# Patient Record
Sex: FEMALE | Race: WHITE | NOT HISPANIC OR LATINO | Employment: OTHER | URBAN - METROPOLITAN AREA
[De-identification: names, ages, dates, MRNs, and addresses within clinical notes are randomized per-mention and may not be internally consistent; named-entity substitution may affect disease eponyms.]

---

## 2017-08-29 ENCOUNTER — HOSPITAL ENCOUNTER (OUTPATIENT)
Dept: ORTHOPEDIC SURGERY | Facility: CLINIC | Age: 82
Discharge: HOME | End: 2017-08-29
Attending: ORTHOPAEDIC SURGERY | Admitting: ORTHOPAEDIC SURGERY

## 2018-02-27 ENCOUNTER — HOSPITAL ENCOUNTER (OUTPATIENT)
Dept: ORTHOPEDIC SURGERY | Facility: CLINIC | Age: 83
Discharge: HOME | End: 2018-02-27
Attending: ORTHOPAEDIC SURGERY | Admitting: ORTHOPAEDIC SURGERY

## 2019-09-06 ENCOUNTER — OFFICE VISIT (OUTPATIENT)
Dept: ORTHOPEDIC SURGERY | Facility: CLINIC | Age: 84
End: 2019-09-06

## 2019-09-06 VITALS
HEIGHT: 63 IN | SYSTOLIC BLOOD PRESSURE: 150 MMHG | DIASTOLIC BLOOD PRESSURE: 72 MMHG | WEIGHT: 144 LBS | HEART RATE: 81 BPM | BODY MASS INDEX: 25.52 KG/M2

## 2019-09-06 DIAGNOSIS — M17.12 PRIMARY LOCALIZED OSTEOARTHROSIS OF LEFT LOWER LEG: ICD-10-CM

## 2019-09-06 DIAGNOSIS — M25.562 ACUTE PAIN OF LEFT KNEE: Primary | ICD-10-CM

## 2019-09-06 PROCEDURE — 20610 DRAIN/INJ JOINT/BURSA W/O US: CPT | Performed by: FAMILY MEDICINE

## 2019-09-06 PROCEDURE — 99213 OFFICE O/P EST LOW 20 MIN: CPT | Performed by: FAMILY MEDICINE

## 2019-09-06 RX ORDER — GABAPENTIN 300 MG/1
CAPSULE ORAL
Refills: 1 | COMMUNITY
Start: 2019-08-30 | End: 2021-08-31

## 2019-09-06 RX ORDER — LISINOPRIL AND HYDROCHLOROTHIAZIDE 25; 20 MG/1; MG/1
1 TABLET ORAL DAILY
COMMUNITY
Start: 2019-05-30

## 2019-09-06 RX ORDER — DOXYCYCLINE HYCLATE 50 MG/1
324 CAPSULE, GELATIN COATED ORAL DAILY
COMMUNITY
End: 2022-03-03

## 2019-09-06 RX ORDER — LANOLIN ALCOHOL/MO/W.PET/CERES
CREAM (GRAM) TOPICAL
COMMUNITY
Start: 2019-08-16

## 2019-09-06 RX ORDER — FOLIC ACID 1 MG/1
1 TABLET ORAL DAILY
COMMUNITY
Start: 2019-08-16 | End: 2019-09-14

## 2019-09-06 NOTE — PROGRESS NOTES
Procedure   Large Joint Arthrocentesis: L knee  Date/Time: 9/6/2019 12:17 PM  Consent given by: patient  Site marked: site marked  Timeout: Immediately prior to procedure a time out was called to verify the correct patient, procedure, equipment, support staff and site/side marked as required   Supporting Documentation  Indications: pain   Procedure Details  Location: knee - L knee  Preparation: Patient was prepped and draped in the usual sterile fashion  Needle size: 22 G  Medication group details: 2cc of 1% lidocaine without epinepherine, and 32mg of Zireltta   Patient tolerance: patient tolerated the procedure well with no immediate complications (Blood loss negligable, pt admits to immediate decrease in pain and improved ROM with gentle ambulation post injection.)

## 2019-09-06 NOTE — PROGRESS NOTES
Primary Care Sports Medicine Office Visit Note     Patient ID: Judi Leal is a 89 y.o. female.    Chief Complaint:  Chief Complaint   Patient presents with   • Left Knee - Consult   • Consult     left knee pain     HPI:    Ms. Judi Leal is a 89 y.o. female who presents to the clinic today for evaluation of chronic left knee pain.  She states that 1 months ago she was seen at a local hospital status post a fall from standing, where she had a fracture of her hip.  She was inpatient for less than a week, and required surgery.  She is healing well from surgical fixation of left femoral neck fracture without issue.  She denies any fevers, nausea vomiting, redness at surgical site, or other surgical complication.    While her surgery seems to have gone well, she continues to have deep achy bone pain of the left knee.  She has been seen and treated here for osteoarthritis of the left knee before, and has done well status post corticosteroid injection.  She was last seen by Dr. Yeung and afforded corticosteroid injection on March 13, 2018.  Due to changes in gait and balance, she requests repeat injection today.      Past Medical History:   Diagnosis Date   • History of colon cancer    • Hypertension        Past Surgical History:   Procedure Laterality Date   • HIP SURGERY     • WRIST SURGERY         History reviewed. No pertinent family history.  Social History     Occupational History   • Not on file   Tobacco Use   • Smoking status: Never Smoker   Substance and Sexual Activity   • Alcohol use: Yes     Frequency: Never     Comment: rare   • Drug use: Not on file   • Sexual activity: Not on file      Review of Systems   Constitutional: Negative for activity change and fever.   Respiratory: Negative for cough and shortness of breath.    Cardiovascular: Negative for chest pain.   Gastrointestinal: Negative for constipation, diarrhea, nausea and vomiting.   Musculoskeletal: Positive for arthralgias.   Skin: Negative  "for color change and rash.   Neurological: Negative for weakness.   Hematological: Does not bruise/bleed easily.       Objective:    /72   Pulse 81   Ht 160 cm (63\")   Wt 65.3 kg (144 lb)   BMI 25.51 kg/m²     Physical Examination:  Physical Exam   Constitutional: She appears well-developed and well-nourished. No distress.   HENT:   Head: Normocephalic and atraumatic.   Eyes: Conjunctivae are normal.   Cardiovascular: Intact distal pulses.   Pulmonary/Chest: Effort normal. No respiratory distress.   Musculoskeletal:        Left knee: She exhibits no effusion.   Neurological: She is alert.   Skin: Skin is warm. Capillary refill takes less than 2 seconds. She is not diaphoretic.     Left Ankle Exam     Range of Motion   The patient has normal left ankle ROM.       Left Knee Exam     Muscle Strength   The patient has normal left knee strength.    Tenderness   The patient is experiencing tenderness in the lateral joint line, medial joint line and patella.    Range of Motion   Left knee extension: mildly decreased end ROM.   Left knee flexion: mildly decreased end ROM.     Tests   Keo:  Medial - negative Lateral - negative  Varus: negative Valgus: negative  Left knee patellar apprehension test: +patellar grind testing, +rebekah perrin testing.    Other   Erythema: absent  Sensation: normal  Pulse: present (distal to the knee, DP and PT palpable)  Swelling: none  Effusion: no effusion present      Left Hip Exam     Tenderness   The patient is experiencing no tenderness.     Range of Motion   Extension: normal   Flexion: 110   External rotation: 40   Internal rotation: 15     Muscle Strength   The patient has normal left hip strength.           Imaging:  3 views of the left and bilateral knees were obtained today that show no obvious fracture, malalignment, or dislocation.  There is mild to moderate joint space narrowing and osteophytic activity of the medial joint spaces bilaterally.  There is mild sclerotic " "activity of the bilateral medial tibial plateaus as well.    Assessment and Plan:    1. Acute pain of left knee  - XR Knee 3 View Left    2. Primary localized osteoarthrosis of left lower leg  The patient has recently had hip surgery, I do feel that the osteoarthritis of her knee is been worsened in the acute circumstance with changes in gait.  We will address her knee pain today with corticosteroid injection.  She tolerated this procedure incredibly well with almost immediate pain relief postinjection.  We will have her follow-up in 3 months to follow her knee, otherwise she can see her surgeon for postoperative instruction following hip surgery.       Reynaldo VELOZ \"Chance\" Sam BAKER DO, CAQSM  09/06/19  11:57 AM    "

## 2021-08-30 ENCOUNTER — TELEPHONE (OUTPATIENT)
Dept: ORTHOPEDIC SURGERY | Facility: CLINIC | Age: 86
End: 2021-08-30

## 2021-08-30 NOTE — TELEPHONE ENCOUNTER
//    Caller: PAKO VITAL    Relationship to patient: GRANDCHILD    Best call back number: [phone]    Chief complaint: INJECTION APPOINTMENT FOR LEFT KNEE    Type of visit: FOLLOW UP     Requested date: 09/02 OR 09/03    If rescheduling, when is the original appointment: NA    Additional notes: PATIENT WAS LAST SEEN ON 09/16/2019

## 2021-08-31 ENCOUNTER — OFFICE VISIT (OUTPATIENT)
Dept: ORTHOPEDIC SURGERY | Facility: CLINIC | Age: 86
End: 2021-08-31

## 2021-08-31 VITALS
WEIGHT: 141 LBS | HEART RATE: 74 BPM | DIASTOLIC BLOOD PRESSURE: 79 MMHG | HEIGHT: 63 IN | BODY MASS INDEX: 24.98 KG/M2 | SYSTOLIC BLOOD PRESSURE: 149 MMHG

## 2021-08-31 DIAGNOSIS — M17.12 PRIMARY OSTEOARTHRITIS OF LEFT KNEE: ICD-10-CM

## 2021-08-31 DIAGNOSIS — M25.562 ACUTE PAIN OF LEFT KNEE: Primary | ICD-10-CM

## 2021-09-01 PROCEDURE — 20610 DRAIN/INJ JOINT/BURSA W/O US: CPT | Performed by: FAMILY MEDICINE

## 2021-09-01 RX ORDER — TRIAMCINOLONE ACETONIDE 40 MG/ML
80 INJECTION, SUSPENSION INTRA-ARTICULAR; INTRAMUSCULAR
Status: COMPLETED | OUTPATIENT
Start: 2021-09-01 | End: 2021-09-01

## 2021-09-01 RX ADMIN — TRIAMCINOLONE ACETONIDE 80 MG: 40 INJECTION, SUSPENSION INTRA-ARTICULAR; INTRAMUSCULAR at 11:45

## 2021-09-01 NOTE — PROGRESS NOTES
Procedure   Large Joint Arthrocentesis: L knee  Date/Time: 9/1/2021 11:45 AM  Consent given by: patient  Site marked: site marked  Timeout: Immediately prior to procedure a time out was called to verify the correct patient, procedure, equipment, support staff and site/side marked as required   Supporting Documentation  Indications: pain   Procedure Details  Location: knee - L knee  Preparation: Patient was prepped and draped in the usual sterile fashion  Needle size: 25 G  Approach: anteromedial  Medications administered: 80 mg triamcinolone acetonide 40 MG/ML (2cc of 1% lidocaine without epinepherine, and 2cc of 40mg Kenalog)  Patient tolerance: patient tolerated the procedure well with no immediate complications (Blood loss negligable, pt admits to immediate decrease in pain and improved ROM with gentle ambulation post injection.)

## 2022-02-22 ENCOUNTER — TRANSCRIBE ORDERS (OUTPATIENT)
Dept: ADMINISTRATIVE | Facility: HOSPITAL | Age: 87
End: 2022-02-22

## 2022-02-22 DIAGNOSIS — R94.31 ABNORMAL ECG: Primary | ICD-10-CM

## 2022-02-22 DIAGNOSIS — R00.2 PALPITATIONS: ICD-10-CM

## 2022-02-22 DIAGNOSIS — R94.31 ABNORMAL EKG: Primary | ICD-10-CM

## 2022-02-23 ENCOUNTER — HOSPITAL ENCOUNTER (OUTPATIENT)
Dept: RESPIRATORY THERAPY | Facility: HOSPITAL | Age: 87
Discharge: HOME | End: 2022-02-23
Admitting: NURSE PRACTITIONER

## 2022-02-23 DIAGNOSIS — R94.31 ABNORMAL ECG: ICD-10-CM

## 2022-02-23 PROCEDURE — 93225 XTRNL ECG REC<48 HRS REC: CPT

## 2022-02-28 PROCEDURE — 93227 XTRNL ECG REC<48 HR R&I: CPT | Performed by: INTERNAL MEDICINE

## 2022-03-01 PROBLEM — S90.32XA CONTUSION OF LEFT FOOT: Status: ACTIVE | Noted: 2018-02-27

## 2022-03-01 PROBLEM — G25.81 RESTLESS LEG SYNDROME: Status: ACTIVE | Noted: 2019-08-30

## 2022-03-01 PROBLEM — D64.9 ANEMIA: Status: ACTIVE | Noted: 2019-08-30

## 2022-03-01 PROBLEM — E78.5 DYSLIPIDEMIA: Status: ACTIVE | Noted: 2017-04-10

## 2022-03-01 PROBLEM — D53.9 MACROCYTIC ANEMIA: Status: ACTIVE | Noted: 2019-09-10

## 2022-03-01 PROBLEM — M25.562 KNEE PAIN, LEFT: Status: ACTIVE | Noted: 2017-08-29

## 2022-03-01 PROBLEM — Z98.890 STATUS POST HIP SURGERY: Status: ACTIVE | Noted: 2019-08-30

## 2022-03-01 PROBLEM — M17.12 DEGENERATIVE JOINT DISEASE OF LEFT KNEE: Status: ACTIVE | Noted: 2017-08-29

## 2022-03-01 PROBLEM — K59.03 DRUG-INDUCED CONSTIPATION: Status: ACTIVE | Noted: 2019-08-30

## 2022-03-01 PROBLEM — Z78.0 POSTMENOPAUSAL: Status: ACTIVE | Noted: 2018-03-26

## 2022-03-02 NOTE — PROGRESS NOTES
Date of Office Visit: 2022  Encounter Provider: Dr. Dylan Melchor  Place of Service: [facility] [place]  Patient Name: Judi Leal  :1928  Johnny Gruber PA    Chief Complaint   Patient presents with   • Hypertension     consult holter   • Chest Pain     History of Present Illness:    I am pleased to see Mrs. Leal in my office today as a new consultation.    As you know, patient is 89 years old white female whose past medical history significant for hypertension who is referred to me for abnormal Holter monitor and shortness of breath.    2 weeks ago patient was drinking water and she had choking episode in which a sip of water got stuck in her upper throat and she could not breathe or swallow.  However this episode was resolved.  Patient had shortness of breath.  Patient did not have chest pain.  Patient does complain of palpitation and skipping of the heartbeat occasionally.  Patient denies any orthopnea or PND no syncope or presyncope.    Patient does not have previous history of CAD, PCI or MI.  Patient does not smoke or abuse alcohol.    EKG showed normal sinus rhythm with PACs.  Right bundle branch block is noted..    Holter monitor done recently showed frequent PACs.  Short run of atrial tachycardia was noted.    At this stage I would recommend to proceed with echocardiogram.  Patient is not very symptomatic with these PVCs and atrial tachycardia.  Occasional skipping of the heartbeat noted.  She has relative bradycardia.  At present I would recommend observation and if there is worsening of symptoms I would consider diltiazem for control of PACs.  Possibility of sleep apnea is there but at age 89 I would not proceed with sleep studies unless patient is more symptomatic with these episodes        Past Medical History:   Diagnosis Date   • Abnormal ECG    • History of colon cancer    • Hypertension          Past Surgical History:   Procedure Laterality Date   • HIP SURGERY    "  • WRIST SURGERY             Current Outpatient Medications:   •  cholecalciferol (VITAMIN D3) 25 MCG (1000 UT) tablet, Take 1,000 Units by mouth Daily., Disp: , Rfl:   •  lisinopril-hydrochlorothiazide (PRINZIDE,ZESTORETIC) 20-25 MG per tablet, Take 1 tablet by mouth Daily., Disp: , Rfl:   •  melatonin 3 MG tablet, Take  by mouth., Disp: , Rfl:   •  Multiple Vitamins-Minerals (MULTIVITAMIN ADULTS) tablet, MULTIVITAMIN ADULTS TABS, Disp: , Rfl:       Social History     Socioeconomic History   • Marital status:    Tobacco Use   • Smoking status: Never Smoker   • Smokeless tobacco: Never Used   Vaping Use   • Vaping Use: Never used   Substance and Sexual Activity   • Alcohol use: Yes     Comment: rare   • Drug use: Never   • Sexual activity: Defer         Review of Systems   Constitutional: Negative for chills and fever.   HENT: Negative for ear discharge and nosebleeds.    Eyes: Negative for discharge and redness.   Cardiovascular: Negative for chest pain, orthopnea, palpitations, paroxysmal nocturnal dyspnea and syncope.   Respiratory: Positive for shortness of breath. Negative for cough and wheezing.    Endocrine: Negative for heat intolerance.   Skin: Negative for rash.   Musculoskeletal: Positive for arthritis and joint pain. Negative for myalgias.   Gastrointestinal: Negative for abdominal pain, melena, nausea and vomiting.   Genitourinary: Negative for dysuria and hematuria.   Neurological: Negative for dizziness, light-headedness, numbness and tremors.   Psychiatric/Behavioral: Negative for depression. The patient is not nervous/anxious.        Procedures      ECG 12 Lead    Date/Time: 3/3/2022 4:22 PM  Performed by: Dylan Melchor MD  Authorized by: Dylan Melchor MD   Comparison: compared with previous ECG   Similar to previous ECG  Rhythm: sinus rhythm  Conduction: right bundle branch block            ECG 12 Lead    (Results Pending)           Objective:    /90   Pulse 63   Ht 160 cm (62.99\")  "  Wt 64 kg (141 lb)   BMI 24.98 kg/m²         Constitutional:       Appearance: Well-developed.   Eyes:      General: No scleral icterus.        Right eye: No discharge.   HENT:      Head: Normocephalic and atraumatic.   Neck:      Thyroid: No thyromegaly.      Lymphadenopathy: No cervical adenopathy.   Pulmonary:      Effort: Pulmonary effort is normal. No respiratory distress.      Breath sounds: Normal breath sounds. No wheezing. No rales.   Cardiovascular:      Normal rate. Regular rhythm.      No gallop.   Abdominal:      Tenderness: There is no abdominal tenderness.   Skin:     Findings: No erythema or rash.   Neurological:      Mental Status: Alert and oriented to person, place, and time.             Assessment:       Diagnosis Plan   1. Chest pain, unspecified type  ECG 12 Lead    Adult Transthoracic Echo Complete W/ Cont if Necessary Per Protocol   2. Essential hypertension  ECG 12 Lead    Adult Transthoracic Echo Complete W/ Cont if Necessary Per Protocol   3. Irregular heart rhythm  ECG 12 Lead    Adult Transthoracic Echo Complete W/ Cont if Necessary Per Protocol   4. Premature atrial contraction  Adult Transthoracic Echo Complete W/ Cont if Necessary Per Protocol   5. Palpitations  Adult Transthoracic Echo Complete W/ Cont if Necessary Per Protocol            Plan:       MDM:    1.  Premature atrial contraction:    Patient has frequent PACs but she is relatively less symptomatic.  I will continue to observe.  I would recommend echocardiogram    2.  Hypertension:    Her blood pressure is very high today but she does not monitor the blood pressure at home.  I advised her to monitor the blood pressure and bring the logbook in 2 months.  If needed I would add Cardizem.  I would recommend echocardiogram    3.  Shortness of breath:    Echocardiogram would be done.

## 2022-03-03 ENCOUNTER — OFFICE VISIT (OUTPATIENT)
Dept: CARDIOLOGY | Facility: CLINIC | Age: 87
End: 2022-03-03

## 2022-03-03 VITALS
BODY MASS INDEX: 24.98 KG/M2 | SYSTOLIC BLOOD PRESSURE: 162 MMHG | HEIGHT: 63 IN | DIASTOLIC BLOOD PRESSURE: 90 MMHG | WEIGHT: 141 LBS | HEART RATE: 63 BPM

## 2022-03-03 DIAGNOSIS — R00.2 PALPITATIONS: ICD-10-CM

## 2022-03-03 DIAGNOSIS — I49.9 IRREGULAR HEART RHYTHM: ICD-10-CM

## 2022-03-03 DIAGNOSIS — I10 ESSENTIAL HYPERTENSION: ICD-10-CM

## 2022-03-03 DIAGNOSIS — I49.1 PREMATURE ATRIAL CONTRACTION: ICD-10-CM

## 2022-03-03 DIAGNOSIS — R07.9 CHEST PAIN, UNSPECIFIED TYPE: Primary | ICD-10-CM

## 2022-03-03 PROCEDURE — 99204 OFFICE O/P NEW MOD 45 MIN: CPT | Performed by: INTERNAL MEDICINE

## 2022-03-03 PROCEDURE — 93000 ELECTROCARDIOGRAM COMPLETE: CPT | Performed by: INTERNAL MEDICINE

## 2022-03-03 RX ORDER — MELATONIN
1000 DAILY
COMMUNITY

## 2022-03-10 ENCOUNTER — PATIENT ROUNDING (BHMG ONLY) (OUTPATIENT)
Dept: CARDIOLOGY | Facility: CLINIC | Age: 87
End: 2022-03-10

## 2022-03-10 NOTE — PROGRESS NOTES
March 10, 2022    Hello, may I speak with Judi Leal?    My name is ADRIA RAYA    I am  with [facility]  [address] [place] [address].    Before we get started may I verify your date of birth? 12/27/1928    I am calling to officially welcome you to our practice and ask about your recent visit. Is this a good time to talk?  YES    Tell me about your visit with us. What things went well?  WENT WELL LOVES DR. AMANDA       We're always looking for ways to make our patients' experiences even better. Do you have recommendations on ways we may improve?  NO    Overall were you satisfied with your first visit to our practice? YES       I appreciate you taking the time to speak with me today. Is there anything else I can do for you? NO      Thank you, and have a great day.

## 2022-03-17 ENCOUNTER — HOSPITAL ENCOUNTER (OUTPATIENT)
Dept: CARDIOLOGY | Facility: HOSPITAL | Age: 87
Discharge: HOME | End: 2022-03-17
Admitting: INTERNAL MEDICINE

## 2022-03-17 VITALS
BODY MASS INDEX: 24.98 KG/M2 | HEIGHT: 63 IN | WEIGHT: 141 LBS | SYSTOLIC BLOOD PRESSURE: 102 MMHG | DIASTOLIC BLOOD PRESSURE: 54 MMHG

## 2022-03-17 DIAGNOSIS — R07.9 CHEST PAIN, UNSPECIFIED TYPE: ICD-10-CM

## 2022-03-17 DIAGNOSIS — I10 ESSENTIAL HYPERTENSION: ICD-10-CM

## 2022-03-17 DIAGNOSIS — I49.1 PREMATURE ATRIAL CONTRACTION: ICD-10-CM

## 2022-03-17 DIAGNOSIS — R00.2 PALPITATIONS: ICD-10-CM

## 2022-03-17 DIAGNOSIS — I49.9 IRREGULAR HEART RHYTHM: ICD-10-CM

## 2022-03-17 PROCEDURE — 93306 TTE W/DOPPLER COMPLETE: CPT | Performed by: INTERNAL MEDICINE

## 2022-03-17 PROCEDURE — 93306 TTE W/DOPPLER COMPLETE: CPT

## 2022-03-22 LAB
BH CV ECHO MEAS - ACS: 1.98 CM
BH CV ECHO MEAS - AO MAX PG: 9.3 MMHG
BH CV ECHO MEAS - AO MEAN PG: 5.7 MMHG
BH CV ECHO MEAS - AO ROOT DIAM: 3.6 CM
BH CV ECHO MEAS - AO V2 MAX: 152.5 CM/SEC
BH CV ECHO MEAS - AO V2 VTI: 33.5 CM
BH CV ECHO MEAS - AVA(I,D): 2.1 CM2
BH CV ECHO MEAS - EDV(CUBED): 77.6 ML
BH CV ECHO MEAS - EDV(MOD-SP4): 62.7 ML
BH CV ECHO MEAS - EF(MOD-BP): 58 %
BH CV ECHO MEAS - EF(MOD-SP4): 58.4 %
BH CV ECHO MEAS - ESV(CUBED): 17.2 ML
BH CV ECHO MEAS - ESV(MOD-SP4): 26.1 ML
BH CV ECHO MEAS - FS: 39.5 %
BH CV ECHO MEAS - IVS/LVPW: 0.8 CM
BH CV ECHO MEAS - IVSD: 0.93 CM
BH CV ECHO MEAS - LA DIMENSION(2D): 3.3 CM
BH CV ECHO MEAS - LV DIASTOLIC VOL/BSA (35-75): 38.1 CM2
BH CV ECHO MEAS - LV MASS(C)D: 148.6 GRAMS
BH CV ECHO MEAS - LV MAX PG: 3.7 MMHG
BH CV ECHO MEAS - LV MEAN PG: 1.78 MMHG
BH CV ECHO MEAS - LV SYSTOLIC VOL/BSA (12-30): 15.8 CM2
BH CV ECHO MEAS - LV V1 MAX: 95.9 CM/SEC
BH CV ECHO MEAS - LV V1 VTI: 20.3 CM
BH CV ECHO MEAS - LVIDD: 4.3 CM
BH CV ECHO MEAS - LVIDS: 2.6 CM
BH CV ECHO MEAS - LVOT AREA: 3.5 CM2
BH CV ECHO MEAS - LVOT DIAM: 2.1 CM
BH CV ECHO MEAS - LVPWD: 1.15 CM
BH CV ECHO MEAS - MR MAX PG: 47.6 MMHG
BH CV ECHO MEAS - MR MAX VEL: 345 CM/SEC
BH CV ECHO MEAS - MV A MAX VEL: 100.9 CM/SEC
BH CV ECHO MEAS - MV DEC SLOPE: 283.8 CM/SEC2
BH CV ECHO MEAS - MV DEC TIME: 0.29 MSEC
BH CV ECHO MEAS - MV E MAX VEL: 82 CM/SEC
BH CV ECHO MEAS - MV E/A: 0.81
BH CV ECHO MEAS - MV MAX PG: 4.7 MMHG
BH CV ECHO MEAS - MV MEAN PG: 2.06 MMHG
BH CV ECHO MEAS - MV V2 VTI: 29.9 CM
BH CV ECHO MEAS - MVA(VTI): 2.35 CM2
BH CV ECHO MEAS - PA V2 MAX: 87.6 CM/SEC
BH CV ECHO MEAS - PI END-D VEL: 107.1 CM/SEC
BH CV ECHO MEAS - RV MAX PG: 2.24 MMHG
BH CV ECHO MEAS - RV V1 MAX: 74.8 CM/SEC
BH CV ECHO MEAS - RV V1 VTI: 15.1 CM
BH CV ECHO MEAS - RVDD: 2.8 CM
BH CV ECHO MEAS - SI(MOD-SP4): 22.2 ML/M2
BH CV ECHO MEAS - SV(LVOT): 70.4 ML
BH CV ECHO MEAS - SV(MOD-SP4): 36.6 ML
BH CV ECHO MEAS - TR MAX PG: 8.9 MMHG
BH CV ECHO MEAS - TR MAX VEL: 149.1 CM/SEC
MAXIMAL PREDICTED HEART RATE: 127 BPM
STRESS TARGET HR: 108 BPM

## 2022-05-10 NOTE — PROGRESS NOTES
Date of Office Visit: 2022  Encounter Provider: Dr. Dylan Melchor  Place of Service: [facility]ARDIOLOG[facility] [place]  Patient Name: Judi Leal  :1928  Johnny Gruber PA    Chief Complaint   Patient presents with   • Chest Pain   • Hypertension   • Follow-up     History of Present Illness    I am pleased to see Mrs. Leal in my office today as a follow-up    As you know, patient is 89 years old white female whose past medical history significant for hypertension who was referred to me for abnormal Holter monitor and shortness of breath.    In 2022, patient was drinking water and she had choking episode in which a sip of water got stuck in her upper throat and she could not breathe or swallow.  However this episode was resolved.  Patient had shortness of breath.  Patient did not have chest pain.  Patient underwent Holter monitor which showed short run of atrial tachycardia in few runs of nonsustained VT.  Patient had frequent PACs and few PVCs.    In 2022, patient underwent echocardiogram which showed normal left ventricular size and function with EF of 55 to 60%.  Trace mitral regurgitation noted.  Mild LVH was present.  Impaired relaxation was noted.    Since the previous visit patient is overall doing well.  Patient denies any symptom of chest pain or tightness or heaviness.  Palpitations are contained.  No orthopnea PND no syncope or presyncope.  No dizziness or lightheadedness.    Patient is overall doing well.  She has not had further palpitation.  At this stage I would recommend observation.  If there is recurrence of palpitation I would consider low-dose Cardizem.  At present continue to observe.  Patient is not having any symptom of angina pectoris I would not proceed with stress test at present.  I will see the patient as needed.  Patient is educated about her symptoms.  If there is recurrence of symptoms or develop new symptoms patient is advised to call me.  Patient was  accompanied by her daughter.  They understand plan and agreed with it        Past Medical History:   Diagnosis Date   • Abnormal ECG    • History of colon cancer    • Hypertension          Past Surgical History:   Procedure Laterality Date   • HIP SURGERY     • WRIST SURGERY             Current Outpatient Medications:   •  cholecalciferol (VITAMIN D3) 25 MCG (1000 UT) tablet, Take 1,000 Units by mouth Daily., Disp: , Rfl:   •  lisinopril-hydrochlorothiazide (PRINZIDE,ZESTORETIC) 20-25 MG per tablet, Take 1 tablet by mouth Daily., Disp: , Rfl:   •  melatonin 3 MG tablet, Take  by mouth., Disp: , Rfl:   •  Multiple Vitamins-Minerals (MULTIVITAMIN ADULTS) tablet, MULTIVITAMIN ADULTS TABS, Disp: , Rfl:       Social History     Socioeconomic History   • Marital status:    Tobacco Use   • Smoking status: Never Smoker   • Smokeless tobacco: Never Used   Vaping Use   • Vaping Use: Never used   Substance and Sexual Activity   • Alcohol use: Yes     Comment: rare   • Drug use: Never   • Sexual activity: Defer         Review of Systems   Constitutional: Negative for chills and fever.   HENT: Negative for ear discharge and nosebleeds.    Eyes: Negative for discharge and redness.   Cardiovascular: Negative for chest pain, orthopnea, palpitations, paroxysmal nocturnal dyspnea and syncope.   Respiratory: Positive for shortness of breath. Negative for cough and wheezing.    Endocrine: Negative for heat intolerance.   Skin: Negative for rash.   Musculoskeletal: Negative for arthritis and myalgias.   Gastrointestinal: Negative for abdominal pain, melena, nausea and vomiting.   Genitourinary: Negative for dysuria and hematuria.   Neurological: Negative for dizziness, light-headedness, numbness and tremors.   Psychiatric/Behavioral: Negative for depression. The patient is not nervous/anxious.        Procedures    Procedures    No orders to display           Objective:    /76 (BP Location: Left arm, Cuff Size: Adult)    "Pulse 60   Ht 160 cm (62.99\")   Wt 60.3 kg (133 lb)   SpO2 97%   BMI 23.57 kg/m²         Constitutional:       Appearance: Well-developed.   Eyes:      General: No scleral icterus.        Right eye: No discharge.   HENT:      Head: Normocephalic and atraumatic.   Neck:      Thyroid: No thyromegaly.      Lymphadenopathy: No cervical adenopathy.   Pulmonary:      Effort: Pulmonary effort is normal. No respiratory distress.      Breath sounds: Normal breath sounds. No wheezing. No rales.   Cardiovascular:      Normal rate. Regular rhythm.      No gallop.   Abdominal:      Tenderness: There is no abdominal tenderness.   Skin:     Findings: No erythema or rash.   Neurological:      Mental Status: Alert and oriented to person, place, and time.             Assessment:       Diagnosis Plan   1. Essential hypertension     2. Palpitations     3. Premature atrial contraction              Plan:       MDM:    1.  Hypertension:    Blood pressure is controlled current treatment would be continued    2.  Palpitation:    Most likely cause of palpitation is underlying PACs but they seem to be improved continue to observe    3.  PACs:    I would not start any Cardizem at present but if needed this can be considered  "

## 2022-05-11 ENCOUNTER — OFFICE VISIT (OUTPATIENT)
Dept: CARDIOLOGY | Facility: CLINIC | Age: 87
End: 2022-05-11

## 2022-05-11 VITALS
OXYGEN SATURATION: 97 % | WEIGHT: 133 LBS | BODY MASS INDEX: 23.57 KG/M2 | HEIGHT: 63 IN | SYSTOLIC BLOOD PRESSURE: 128 MMHG | HEART RATE: 60 BPM | DIASTOLIC BLOOD PRESSURE: 76 MMHG

## 2022-05-11 DIAGNOSIS — I49.1 PREMATURE ATRIAL CONTRACTION: ICD-10-CM

## 2022-05-11 DIAGNOSIS — I10 ESSENTIAL HYPERTENSION: Primary | ICD-10-CM

## 2022-05-11 DIAGNOSIS — R00.2 PALPITATIONS: ICD-10-CM

## 2022-05-11 PROCEDURE — 99213 OFFICE O/P EST LOW 20 MIN: CPT | Performed by: INTERNAL MEDICINE

## 2022-10-05 ENCOUNTER — APPOINTMENT (OUTPATIENT)
Dept: GENERAL RADIOLOGY | Facility: HOSPITAL | Age: 87
End: 2022-10-05

## 2022-10-05 ENCOUNTER — HOSPITAL ENCOUNTER (EMERGENCY)
Facility: HOSPITAL | Age: 87
Discharge: HOME | End: 2022-10-05
Attending: EMERGENCY MEDICINE | Admitting: EMERGENCY MEDICINE

## 2022-10-05 VITALS
WEIGHT: 133 LBS | TEMPERATURE: 97.7 F | DIASTOLIC BLOOD PRESSURE: 66 MMHG | HEIGHT: 65 IN | RESPIRATION RATE: 17 BRPM | HEART RATE: 88 BPM | BODY MASS INDEX: 22.16 KG/M2 | OXYGEN SATURATION: 99 % | SYSTOLIC BLOOD PRESSURE: 127 MMHG

## 2022-10-05 DIAGNOSIS — R58 ECCHYMOSIS: Primary | ICD-10-CM

## 2022-10-05 DIAGNOSIS — M25.572 ACUTE LEFT ANKLE PAIN: ICD-10-CM

## 2022-10-05 LAB
BASOPHILS # BLD AUTO: 0.03 10*3/MM3 (ref 0–0.2)
BASOPHILS NFR BLD AUTO: 0.6 % (ref 0–1.5)
DEPRECATED RDW RBC AUTO: 46 FL (ref 37–54)
EOSINOPHIL # BLD AUTO: 0.13 10*3/MM3 (ref 0–0.4)
EOSINOPHIL NFR BLD AUTO: 2.7 % (ref 0.3–6.2)
ERYTHROCYTE [DISTWIDTH] IN BLOOD BY AUTOMATED COUNT: 12.1 % (ref 12.3–15.4)
HCT VFR BLD AUTO: 35.6 % (ref 34–46.6)
HGB BLD-MCNC: 11.3 G/DL (ref 12–15.9)
IMM GRANULOCYTES # BLD AUTO: 0.01 10*3/MM3 (ref 0–0.05)
IMM GRANULOCYTES NFR BLD AUTO: 0.2 % (ref 0–0.5)
LYMPHOCYTES # BLD AUTO: 1.47 10*3/MM3 (ref 0.7–3.1)
LYMPHOCYTES NFR BLD AUTO: 30.2 % (ref 19.6–45.3)
MCH RBC QN AUTO: 31.8 PG (ref 26.6–33)
MCHC RBC AUTO-ENTMCNC: 31.7 G/DL (ref 31.5–35.7)
MCV RBC AUTO: 100.3 FL (ref 79–97)
MONOCYTES # BLD AUTO: 0.61 10*3/MM3 (ref 0.1–0.9)
MONOCYTES NFR BLD AUTO: 12.6 % (ref 5–12)
NEUTROPHILS NFR BLD AUTO: 2.61 10*3/MM3 (ref 1.7–7)
NEUTROPHILS NFR BLD AUTO: 53.7 % (ref 42.7–76)
PLATELET # BLD AUTO: 182 10*3/MM3 (ref 140–450)
PMV BLD AUTO: 10.7 FL (ref 6–12)
RBC # BLD AUTO: 3.55 10*6/MM3 (ref 3.77–5.28)
WBC NRBC COR # BLD: 4.86 10*3/MM3 (ref 3.4–10.8)

## 2022-10-05 PROCEDURE — 73610 X-RAY EXAM OF ANKLE: CPT

## 2022-10-05 PROCEDURE — 99282 EMERGENCY DEPT VISIT SF MDM: CPT | Performed by: EMERGENCY MEDICINE

## 2022-10-05 PROCEDURE — 36415 COLL VENOUS BLD VENIPUNCTURE: CPT

## 2022-10-05 PROCEDURE — 85025 COMPLETE CBC W/AUTO DIFF WBC: CPT | Performed by: EMERGENCY MEDICINE

## 2022-10-05 PROCEDURE — 99282 EMERGENCY DEPT VISIT SF MDM: CPT

## 2022-10-05 NOTE — ED NOTES
Pt reports to the ED for c/o ankle discoloration.  Pt states that on Saturday she started noticing the top of her ankle having some mild bruising.  Family at bedside states that pt still exercises and thinks she might of over did it.  Bruising has continued to stay.  Pt doesn't report any pain. No calf tenderness noted, pt not on blood thinners.  Isn't on any home medications.  Ambulated to the room without any difficulty.  +cap refill.

## 2022-10-06 NOTE — ED PROVIDER NOTES
Subjective   History of Present Illness  89 yof presents with bruising to her left ankle. Pt does not remember hurting it but notes it is a little tender. Pt is not on blood thinners and has no h/o bleeding issues.         Review of Systems   Musculoskeletal:        Left ankle pain   Skin:        Bruising to left ankle   All other systems reviewed and are negative.      Past Medical History:   Diagnosis Date   • Abnormal ECG    • History of colon cancer    • Hypertension        No Known Allergies    Past Surgical History:   Procedure Laterality Date   • HIP SURGERY     • WRIST SURGERY         Family History   Problem Relation Age of Onset   • Heart attack Mother    • Heart disease Mother        Social History     Socioeconomic History   • Marital status:    Tobacco Use   • Smoking status: Never Smoker   • Smokeless tobacco: Never Used   Vaping Use   • Vaping Use: Never used   Substance and Sexual Activity   • Alcohol use: Yes     Comment: rare   • Drug use: Never   • Sexual activity: Defer           Objective   Physical Exam  Vitals reviewed.   Constitutional:       Appearance: Normal appearance.   HENT:      Head: Normocephalic and atraumatic.      Nose: Nose normal.      Mouth/Throat:      Mouth: Mucous membranes are moist.   Eyes:      Extraocular Movements: Extraocular movements intact.      Pupils: Pupils are equal, round, and reactive to light.   Cardiovascular:      Rate and Rhythm: Normal rate and regular rhythm.      Pulses: Normal pulses.      Heart sounds: Normal heart sounds.   Musculoskeletal:         General: Tenderness present. No swelling or deformity. Normal range of motion.      Comments: Ecchymosis to anterior distal left leg  No calf pain  No calf tenderness  No swelling  No pain along deep veins of the leg  FROM of left ankle 2 + pulses NV intact   Skin:     General: Skin is warm and dry.      Capillary Refill: Capillary refill takes less than 2 seconds.   Neurological:      General: No  focal deficit present.      Mental Status: She is alert.         Procedures           ED Course    area of ecchymosis to left distal leg, no evidence of DVT, septic join,XRAY neg for fracture or dislocation, normal Hb and platelets                                        MDM    Final diagnoses:   Ecchymosis   Acute left ankle pain       ED Disposition  ED Disposition     ED Disposition   Discharge    Condition   Stable    Comment   --             Anisha Bass MD  [address] [facility] [place] [address]  [phone]    In 2 days           Medication List      No changes were made to your prescriptions during this visit.          Shahana Martin MD  10/06/22 0459

## 2022-10-12 NOTE — DISCHARGE INSTRUCTIONS
Rest, ice and elevate. Follow-up with your Doctor. Return if problems.    Price Per Unit Or Per Cc In $ (Use Numbers Only, No Special Characters Or $): 13.00

## 2022-11-23 ENCOUNTER — TELEPHONE (OUTPATIENT)
Dept: ORTHOPEDIC SURGERY | Facility: CLINIC | Age: 87
End: 2022-11-23

## 2022-11-23 NOTE — TELEPHONE ENCOUNTER
Provider: DR. MAHARAJ    Caller:MARIA DEL CARMEN GONZALEZ    Relationship to Patient: DAUGHTER    PATIENT: RIKI CORREIA    Phone Number:[phone]    Reason for Call: SCHEDULE INJECTION IN HIP. DAUGHTER NOT SURE WHICH HIP.    When was the patient last seen: 8/31/21

## 2025-04-21 ENCOUNTER — TELEPHONE (OUTPATIENT)
Dept: CARDIOLOGY | Facility: CLINIC | Age: OVER 89
End: 2025-04-21

## 2025-04-21 ENCOUNTER — OFFICE VISIT (OUTPATIENT)
Dept: CARDIOLOGY | Facility: CLINIC | Age: OVER 89
End: 2025-04-21
Payer: MEDICARE

## 2025-04-21 ENCOUNTER — LAB (OUTPATIENT)
Dept: LAB | Facility: HOSPITAL | Age: OVER 89
End: 2025-04-21
Payer: MEDICARE

## 2025-04-21 VITALS
OXYGEN SATURATION: 97 % | HEART RATE: 59 BPM | DIASTOLIC BLOOD PRESSURE: 67 MMHG | BODY MASS INDEX: 22.16 KG/M2 | HEIGHT: 65 IN | WEIGHT: 133 LBS | SYSTOLIC BLOOD PRESSURE: 115 MMHG | RESPIRATION RATE: 16 BRPM

## 2025-04-21 DIAGNOSIS — I48.0 PAF (PAROXYSMAL ATRIAL FIBRILLATION): ICD-10-CM

## 2025-04-21 DIAGNOSIS — I49.9 IRREGULAR HEART RHYTHM: ICD-10-CM

## 2025-04-21 DIAGNOSIS — I10 ESSENTIAL HYPERTENSION: ICD-10-CM

## 2025-04-21 DIAGNOSIS — I48.0 PAF (PAROXYSMAL ATRIAL FIBRILLATION): Primary | ICD-10-CM

## 2025-04-21 DIAGNOSIS — R00.8 SUPRAVENTRICULAR BIGEMINY: ICD-10-CM

## 2025-04-21 LAB
ALBUMIN SERPL-MCNC: 4.2 G/DL (ref 3.5–5.2)
ALBUMIN/GLOB SERPL: 1.9 G/DL
ALP SERPL-CCNC: 74 U/L (ref 39–117)
ALT SERPL W P-5'-P-CCNC: 28 U/L (ref 1–33)
ANION GAP SERPL CALCULATED.3IONS-SCNC: 8.6 MMOL/L (ref 5–15)
AST SERPL-CCNC: 34 U/L (ref 1–32)
BILIRUB SERPL-MCNC: 0.2 MG/DL (ref 0–1.2)
BUN SERPL-MCNC: 35 MG/DL (ref 8–23)
BUN/CREAT SERPL: 32.4 (ref 7–25)
CALCIUM SPEC-SCNC: 10 MG/DL (ref 8.2–9.6)
CHLORIDE SERPL-SCNC: 102 MMOL/L (ref 98–107)
CO2 SERPL-SCNC: 28.4 MMOL/L (ref 22–29)
CREAT SERPL-MCNC: 1.08 MG/DL (ref 0.57–1)
EGFRCR SERPLBLD CKD-EPI 2021: 47.1 ML/MIN/1.73
GLOBULIN UR ELPH-MCNC: 2.2 GM/DL
GLUCOSE SERPL-MCNC: 109 MG/DL (ref 65–99)
MAGNESIUM SERPL-MCNC: 2.4 MG/DL (ref 1.7–2.3)
POTASSIUM SERPL-SCNC: 4.6 MMOL/L (ref 3.5–5.2)
PROT SERPL-MCNC: 6.4 G/DL (ref 6–8.5)
SODIUM SERPL-SCNC: 139 MMOL/L (ref 136–145)
TSH SERPL DL<=0.05 MIU/L-ACNC: 3.92 UIU/ML (ref 0.27–4.2)

## 2025-04-21 PROCEDURE — 36415 COLL VENOUS BLD VENIPUNCTURE: CPT

## 2025-04-21 PROCEDURE — 80053 COMPREHEN METABOLIC PANEL: CPT

## 2025-04-21 PROCEDURE — 93000 ELECTROCARDIOGRAM COMPLETE: CPT | Performed by: NURSE PRACTITIONER

## 2025-04-21 PROCEDURE — 1159F MED LIST DOCD IN RCRD: CPT | Performed by: NURSE PRACTITIONER

## 2025-04-21 PROCEDURE — 99214 OFFICE O/P EST MOD 30 MIN: CPT | Performed by: NURSE PRACTITIONER

## 2025-04-21 PROCEDURE — 84443 ASSAY THYROID STIM HORMONE: CPT

## 2025-04-21 PROCEDURE — 83735 ASSAY OF MAGNESIUM: CPT

## 2025-04-21 PROCEDURE — 1160F RVW MEDS BY RX/DR IN RCRD: CPT | Performed by: NURSE PRACTITIONER

## 2025-04-21 RX ORDER — SENNOSIDES 8.6 MG
325 CAPSULE ORAL EVERY 6 HOURS PRN
COMMUNITY

## 2025-04-21 NOTE — PROGRESS NOTES
Cardiology Office Follow Up Visit      Primary Care Provider:  Anisha Bass MD    Reason for f/u:     Concern for A-fib  Palpitations      Subjective     CC:    Denies chest pain or dyspnea    History of Present Illness       Judi Leal is a 89 y.o. female.  Patient is a 89-year-old female who was previously evaluated by Dr. Melchor and last seen in May 2022.  She is known to have a history of hypertension.    In January 2022 patient had an episode of a choking sensation and shortness of breath.  She had a Holter monitor placed which showed a short run of atrial tachycardia and a few runs of nonsustained ventricular tachycardia.  She was noted to have frequent PACs and PVCs.    Echocardiogram was done in March 2022 which showed normal LV size and function.  EF 55 to 60%.  There was trace MR, mild LVH was present.  There was diastolic dysfunction.    Patient contacted our office this requesting appointment due to concern for atrial fibrillation.    According to the patient she was seen by her primary care physician this morning and was told that she was in A-fib.    Unfortunately I do not have that EKG.    She denies any chest pain or dyspnea at this time.  She denies any feelings of her heart racing, lower extremity edema, PND, orthopnea.    She reports compliance with prescribed medical therapy.    The patient did sustain a fall within the last couple of months requiring sutures and staples on her head she was seen at [facility].              ASSESSMENT/PLAN:      Diagnoses and all orders for this visit:    1. PAF (paroxysmal atrial fibrillation) (Primary)  -     Cardiac Event Monitor (CAROL) or Mobile Cardiac Outpatient Telemetry (MCT); Future  -     Adult Transthoracic Echo Complete W/ Cont if Necessary Per Protocol; Future  -     Comprehensive Metabolic Panel; Future  -     TSH; Future  -     Magnesium; Future    2. Essential hypertension    3. Irregular heart rhythm    4. Supraventricular  kendrick    Other orders  -     ECG 12 Lead            MEDICAL DECISION MAKING:    EKG done in our office today shows sinus rhythm with supraventricular PACs.  She is not currently in A-fib.  She has underlying right bundle branch block.    She is not having any symptoms of chest pain or dyspnea.    She was told by her primary care provider to start a full-strength aspirin.  There was concern about long-term anticoagulation due to her recent fall.    Patient's [id number] score is equal to 4.    I have had a discussion with the patient and her daughter who was present.  Have notified them that she is not currently in atrial fibrillation.  We will place a 2-week M cot monitor to assess for burden of A-fib.  I would like to repeat echocardiogram.  We will also check a TSH, CMP and magnesium level.  She will be followed up back here with Dr. Melchor after the testing is complete.  Patient and her daughter are in agreement to this plan.  I will also request a copy of her EKG done by her PCP today for review        Past Medical History:   Diagnosis Date    Abnormal ECG     History of colon cancer     Hypertension        Past Surgical History:   Procedure Laterality Date    HIP SURGERY      WRIST SURGERY           Current Outpatient Medications:     aspirin 325 MG EC tablet, Take 1 tablet by mouth Every 6 (Six) Hours As Needed for Mild Pain., Disp: , Rfl:     cholecalciferol (VITAMIN D3) 25 MCG (1000 UT) tablet, Take 1 tablet by mouth Daily., Disp: , Rfl:     lisinopril-hydrochlorothiazide (PRINZIDE,ZESTORETIC) 20-25 MG per tablet, Take 1 tablet by mouth Daily., Disp: , Rfl:     melatonin 3 MG tablet, Take  by mouth., Disp: , Rfl:     Multiple Vitamins-Minerals (MULTIVITAMIN ADULTS) tablet, MULTIVITAMIN ADULTS TABS, Disp: , Rfl:     Social History     Socioeconomic History    Marital status:    Tobacco Use    Smoking status: Never    Smokeless tobacco: Never   Vaping Use    Vaping status: Never Used   Substance and  "Sexual Activity    Alcohol use: Yes     Comment: rare    Drug use: Never    Sexual activity: Defer       Family History   Problem Relation Age of Onset    Heart attack Mother     Heart disease Mother        The following portions of the patient's history were reviewed and updated as appropriate: allergies, current medications, past family history, past medical history, past social history, past surgical history and problem list.    Review of Systems   Constitutional: Positive for malaise/fatigue.   Musculoskeletal:  Positive for falls.   Neurological:  Positive for weakness.       Pertinent items are noted in HPI, all other systems reviewed and negative    /67 (BP Location: Right arm, Patient Position: Sitting)   Pulse 59 Comment: ekg  Resp 16   Ht 165.1 cm (65\")   Wt 60.3 kg (133 lb)   SpO2 97%   BMI 22.13 kg/m² .  Objective     Vitals reviewed.   Constitutional:       General: Not in acute distress.     Appearance: Normal appearance. Well-developed.   Eyes:      Pupils: Pupils are equal, round, and reactive to light.   HENT:      Head: Normocephalic and atraumatic.   Neck:      Vascular: No JVD.   Pulmonary:      Effort: Pulmonary effort is normal.      Breath sounds: Normal breath sounds.   Cardiovascular:      Normal rate. Irregular rhythm.   Pulses:     Intact distal pulses.   Edema:     Peripheral edema absent.   Abdominal:      General: There is no distension.      Palpations: Abdomen is soft.      Tenderness: There is no abdominal tenderness.   Musculoskeletal: Normal range of motion.      Cervical back: Normal range of motion and neck supple. Skin:     General: Skin is warm and dry.   Neurological:      Mental Status: Alert and oriented to person, place, and time.             ECG 12 Lead    Date/Time: 4/21/2025 1:35 PM  Performed by: Rianna Seaman APRN    Authorized by: Rianna Seaman APRN  Rhythm: sinus rhythm  Ectopy: atrial premature contractions  BPM: 59  Comments: Sinus bradycardia " with supraventricular bigeminy          EKG ordered by and reviewed by me in office

## 2025-04-21 NOTE — TELEPHONE ENCOUNTER
Caller: DARELL    Relationship to patient: Child    Best call back number: [phone]     Patient is needing: PT HAD A VISIT WITH PROVIDER AT [facility] AND THE EKG SHOWED AFIB. [facility] WAS ADVISING PT TO BE SEEN WITH DR VASILIY VALENTINE, PT WAS LAST SEEN ON 5.11.2022. PLS CALL TO SCHEDULE.

## 2025-04-22 ENCOUNTER — TELEPHONE (OUTPATIENT)
Dept: CARDIOLOGY | Facility: CLINIC | Age: OVER 89
End: 2025-04-22

## 2025-04-22 DIAGNOSIS — I10 ESSENTIAL HYPERTENSION: Primary | ICD-10-CM

## 2025-04-22 NOTE — TELEPHONE ENCOUNTER
Caller: PATXON    Relationship: Provider    Best call back number: [phone]    What form or medical record are you requesting: LAST OFFICE NOTE AND LABS    Who is requesting this form or medical record from you: [facility]/ PCP    How would you like to receive the form or medical records (pick-up, mail, fax): FAX  If fax, what is the fax number: [phone]    Timeframe paperwork needed: ASAP    Additional notes: PAXTON IS CALLING FROM [facility] TO GET THE LAST OFFICE NOTE FROM 4/21/25 AND THE LABS THAT WERE COMPLETED.

## 2025-05-08 ENCOUNTER — LAB (OUTPATIENT)
Dept: LAB | Facility: HOSPITAL | Age: OVER 89
End: 2025-05-08
Payer: MEDICARE

## 2025-05-08 ENCOUNTER — HOSPITAL ENCOUNTER (OUTPATIENT)
Dept: CARDIOLOGY | Facility: HOSPITAL | Age: OVER 89
Discharge: HOME | End: 2025-05-08
Payer: MEDICARE

## 2025-05-08 DIAGNOSIS — I10 ESSENTIAL HYPERTENSION: ICD-10-CM

## 2025-05-08 DIAGNOSIS — I48.0 PAF (PAROXYSMAL ATRIAL FIBRILLATION): ICD-10-CM

## 2025-05-08 LAB
ANION GAP SERPL CALCULATED.3IONS-SCNC: 10.1 MMOL/L (ref 5–15)
AORTIC DIMENSIONLESS INDEX: 0.59 (DI)
AV MEAN PRESS GRAD SYS DOP V1V2: 6 MMHG
AV VMAX SYS DOP: 171 CM/SEC
BH CV ECHO LEFT VENTRICLE GLOBAL LONGITUDINAL STRAIN: -19.1 %
BH CV ECHO MEAS - ACS: 1.4 CM
BH CV ECHO MEAS - AO MAX PG: 11.7 MMHG
BH CV ECHO MEAS - AO V2 VTI: 36.8 CM
BH CV ECHO MEAS - AVA(I,D): 1.51 CM2
BH CV ECHO MEAS - EDV(CUBED): 50.7 ML
BH CV ECHO MEAS - EDV(MOD-SP4): 86 ML
BH CV ECHO MEAS - EF(MOD-SP4): 68.4 %
BH CV ECHO MEAS - ESV(CUBED): 10.6 ML
BH CV ECHO MEAS - ESV(MOD-SP4): 27.2 ML
BH CV ECHO MEAS - FS: 40.5 %
BH CV ECHO MEAS - IVS/LVPW: 0.8 CM
BH CV ECHO MEAS - IVSD: 1.2 CM
BH CV ECHO MEAS - LA DIMENSION: 3.8 CM
BH CV ECHO MEAS - LAT PEAK E' VEL: 7.8 CM/SEC
BH CV ECHO MEAS - LV DIASTOLIC VOL/BSA (35-75): 51.7 CM2
BH CV ECHO MEAS - LV MASS(C)D: 176.6 GRAMS
BH CV ECHO MEAS - LV MAX PG: 3.8 MMHG
BH CV ECHO MEAS - LV MEAN PG: 2 MMHG
BH CV ECHO MEAS - LV SYSTOLIC VOL/BSA (12-30): 16.4 CM2
BH CV ECHO MEAS - LV V1 MAX: 96.9 CM/SEC
BH CV ECHO MEAS - LV V1 VTI: 21.8 CM
BH CV ECHO MEAS - LVIDD: 3.7 CM
BH CV ECHO MEAS - LVIDS: 2.2 CM
BH CV ECHO MEAS - LVOT AREA: 2.5 CM2
BH CV ECHO MEAS - LVOT DIAM: 1.8 CM
BH CV ECHO MEAS - LVPWD: 1.5 CM
BH CV ECHO MEAS - MED PEAK E' VEL: 4.7 CM/SEC
BH CV ECHO MEAS - MR MAX PG: 51.3 MMHG
BH CV ECHO MEAS - MR MAX VEL: 358 CM/SEC
BH CV ECHO MEAS - MV A MAX VEL: 89.6 CM/SEC
BH CV ECHO MEAS - MV DEC SLOPE: 428 CM/SEC2
BH CV ECHO MEAS - MV DEC TIME: 0.24 SEC
BH CV ECHO MEAS - MV E MAX VEL: 98.5 CM/SEC
BH CV ECHO MEAS - MV E/A: 1.1
BH CV ECHO MEAS - MV MAX PG: 5.5 MMHG
BH CV ECHO MEAS - MV MEAN PG: 2 MMHG
BH CV ECHO MEAS - MV P1/2T: 84.9 MSEC
BH CV ECHO MEAS - MV V2 VTI: 30.3 CM
BH CV ECHO MEAS - MVA(P1/2T): 2.6 CM2
BH CV ECHO MEAS - MVA(VTI): 1.83 CM2
BH CV ECHO MEAS - PA ACC TIME: 0.11 SEC
BH CV ECHO MEAS - PA V2 MAX: 77 CM/SEC
BH CV ECHO MEAS - RV MAX PG: 1.98 MMHG
BH CV ECHO MEAS - RV V1 MAX: 70.3 CM/SEC
BH CV ECHO MEAS - RV V1 VTI: 15.1 CM
BH CV ECHO MEAS - RVDD: 4 CM
BH CV ECHO MEAS - SV(LVOT): 55.5 ML
BH CV ECHO MEAS - SV(MOD-SP4): 58.8 ML
BH CV ECHO MEAS - SVI(LVOT): 33.4 ML/M2
BH CV ECHO MEAS - SVI(MOD-SP4): 35.4 ML/M2
BH CV ECHO MEAS - TAPSE (>1.6): 1.87 CM
BH CV ECHO MEAS - TR MAX PG: 26.4 MMHG
BH CV ECHO MEAS - TR MAX VEL: 257 CM/SEC
BH CV ECHO MEASUREMENTS AVERAGE E/E' RATIO: 15.76
BH CV XLRA - TDI S': 9.2 CM/SEC
BUN SERPL-MCNC: 20 MG/DL (ref 8–23)
BUN/CREAT SERPL: 22.2 (ref 7–25)
CALCIUM SPEC-SCNC: 9.6 MG/DL (ref 8.2–9.6)
CHLORIDE SERPL-SCNC: 104 MMOL/L (ref 98–107)
CO2 SERPL-SCNC: 24.9 MMOL/L (ref 22–29)
CREAT SERPL-MCNC: 0.9 MG/DL (ref 0.57–1)
EGFRCR SERPLBLD CKD-EPI 2021: 58.6 ML/MIN/1.73
GLUCOSE SERPL-MCNC: 88 MG/DL (ref 65–99)
LV EF 3D SEGMENTATION: 64 %
LV EF BIPLANE MOD: 68 %
POTASSIUM SERPL-SCNC: 5.1 MMOL/L (ref 3.5–5.2)
SINUS: 2.9 CM
SODIUM SERPL-SCNC: 139 MMOL/L (ref 136–145)
STJ: 2.1 CM

## 2025-05-08 PROCEDURE — 93356 MYOCRD STRAIN IMG SPCKL TRCK: CPT

## 2025-05-08 PROCEDURE — 80048 BASIC METABOLIC PNL TOTAL CA: CPT

## 2025-05-08 PROCEDURE — 36415 COLL VENOUS BLD VENIPUNCTURE: CPT

## 2025-05-08 PROCEDURE — 93306 TTE W/DOPPLER COMPLETE: CPT

## 2025-05-13 ENCOUNTER — TELEPHONE (OUTPATIENT)
Dept: CARDIOLOGY | Facility: CLINIC | Age: OVER 89
End: 2025-05-13

## 2025-05-13 NOTE — TELEPHONE ENCOUNTER
Caller: nicki pederson    Relationship: Emergency Contact    Best call back number: [phone]    Caller requesting test results: DAUGHTER    What test was performed: ECHO    When was the test performed: 05/08/2025        Additional notes: PLEASE CALL WITH RESULTS OF ECHO AND LABS

## 2025-05-16 NOTE — TELEPHONE ENCOUNTER
Caller: maria del carmen pederson    Relationship: Emergency Contact    Best call back number: [phone]     What was the call regarding: PT DAUGHTER CALLING AS THEY STATE THEY HAVE NOT HEARD ABOUT RESULTS - MARIA DEL CARMEN STATES PT HAD A HOLTER AND A ECHO DONE - SHE GAVE DIFFERENT NUMBER THAN IS LISTED IN PREVIOUS TE, SHE STATES SHE HAS NOT SPOKEN WITH ANYONE AS OF YET AND WAS EXPECTING A CALL

## 2025-05-19 ENCOUNTER — TELEPHONE (OUTPATIENT)
Dept: CARDIOLOGY | Facility: CLINIC | Age: OVER 89
End: 2025-05-19
Payer: MEDICARE

## 2025-05-19 NOTE — TELEPHONE ENCOUNTER
Spoke with patient about wanting to make an appt with dr sharpe she wanted me to call her daughter Paola. I called her but was unable to leave a vm because it was full.     She needs an appt with dr sharpe to discuss her holter.

## 2025-05-28 ENCOUNTER — OFFICE VISIT (OUTPATIENT)
Dept: CARDIOLOGY | Facility: CLINIC | Age: OVER 89
End: 2025-05-28
Payer: MEDICARE

## 2025-05-28 VITALS
SYSTOLIC BLOOD PRESSURE: 160 MMHG | RESPIRATION RATE: 16 BRPM | HEIGHT: 65 IN | DIASTOLIC BLOOD PRESSURE: 79 MMHG | WEIGHT: 133 LBS | BODY MASS INDEX: 22.16 KG/M2 | HEART RATE: 81 BPM | OXYGEN SATURATION: 96 %

## 2025-05-28 DIAGNOSIS — I10 PRIMARY HYPERTENSION: Primary | ICD-10-CM

## 2025-05-28 DIAGNOSIS — I10 ESSENTIAL HYPERTENSION: ICD-10-CM

## 2025-05-28 DIAGNOSIS — R07.9 CHEST PAIN, UNSPECIFIED TYPE: ICD-10-CM

## 2025-05-28 DIAGNOSIS — E78.5 DYSLIPIDEMIA: ICD-10-CM

## 2025-05-28 PROCEDURE — 1159F MED LIST DOCD IN RCRD: CPT | Performed by: INTERNAL MEDICINE

## 2025-05-28 PROCEDURE — 1160F RVW MEDS BY RX/DR IN RCRD: CPT | Performed by: INTERNAL MEDICINE

## 2025-05-28 PROCEDURE — 99213 OFFICE O/P EST LOW 20 MIN: CPT | Performed by: INTERNAL MEDICINE

## 2025-05-28 NOTE — PROGRESS NOTES
Date of Office Visit: 2025  Encounter Provider: Dylan Melchor MD  Place of Service: [facility] [place]  Patient Name: Judi Leal  :1928  Thais Coyne MD    Chief Complaint   Patient presents with    Hypertension    Follow-up     History of Present Illness:    I am pleased to see Mrs. Leal in my office today as a follow-up     As you know, patient is 89 years old white female whose past medical history significant for hypertension who was referred to me for abnormal Holter monitor and shortness of breath.     In 2022, patient was drinking water and she had choking episode in which a sip of water got stuck in her upper throat and she could not breathe or swallow.  However this episode was resolved.  Patient had shortness of breath.  Patient did not have chest pain.  Patient underwent Holter monitor which showed short run of atrial tachycardia in few runs of nonsustained VT.  Patient had frequent PACs and few PVCs.     In 2022, patient underwent echocardiogram which showed normal left ventricular size and function with EF of 55 to 60%.  Trace mitral regurgitation noted.  Mild LVH was present.  Impaired relaxation was noted.    In 2025, patient underwent event monitor for possible irregular heartbeat.  It showed no atrial fibrillation or flutter.  Patient had 1 episode of narrow complex tachycardia consistent with SVT/atrial tachycardia lasting for 1 minute and a half.  Patient underwent echocardiogram which showed EF of 55 to 60%.  Mild to moderate mitral regurgitation noted.  Patient had mild aortic stenosis.  LVH was noted.    Patient came today for follow-up.  Patient denies any significant episode of palpitation.  Patient denies any orthopnea, PND, syncope or presyncope.  No leg edema noted.    Patient did not have any atrial fibrillation.  I will continue aspirin for now.  I would not add any beta-blocker.  If there is demonstration of atrial  fibrillation consider anticoagulation.  Patient is advised to monitor the blood pressure at home.  Previously blood pressure was stable.  I suspect whitecoat hypertension    Past Medical History:   Diagnosis Date    Abnormal ECG     History of colon cancer     Hypertension          Past Surgical History:   Procedure Laterality Date    HIP SURGERY      WRIST SURGERY             Current Outpatient Medications:     cholecalciferol (VITAMIN D3) 25 MCG (1000 UT) tablet, Take 1 tablet by mouth Daily., Disp: , Rfl:     lisinopril-hydrochlorothiazide (PRINZIDE,ZESTORETIC) 20-25 MG per tablet, Take 1 tablet by mouth Daily., Disp: , Rfl:     melatonin 3 MG tablet, Take  by mouth., Disp: , Rfl:     Multiple Vitamins-Minerals (MULTIVITAMIN ADULTS) tablet, MULTIVITAMIN ADULTS TABS, Disp: , Rfl:       Social History     Socioeconomic History    Marital status:    Tobacco Use    Smoking status: Never    Smokeless tobacco: Never   Vaping Use    Vaping status: Never Used   Substance and Sexual Activity    Alcohol use: Yes     Comment: rare    Drug use: Never    Sexual activity: Defer         Review of Systems   Constitutional: Negative for chills and fever.   HENT:  Negative for ear discharge and nosebleeds.    Eyes:  Negative for discharge and redness.   Cardiovascular:  Negative for chest pain, orthopnea, palpitations, paroxysmal nocturnal dyspnea and syncope.   Respiratory:  Negative for cough, shortness of breath and wheezing.    Endocrine: Negative for heat intolerance.   Skin:  Negative for rash.   Musculoskeletal:  Negative for arthritis and myalgias.   Gastrointestinal:  Negative for abdominal pain, melena, nausea and vomiting.   Genitourinary:  Negative for dysuria and hematuria.   Neurological:  Negative for dizziness, light-headedness, numbness and tremors.   Psychiatric/Behavioral:  Negative for depression. The patient is not nervous/anxious.        Procedures    Procedures    No orders to display          "  Objective:    /79 (BP Location: Right arm, Patient Position: Sitting, Cuff Size: Large Adult)   Pulse 81   Resp 16   Ht 165 cm (64.96\")   Wt 60.3 kg (133 lb)   SpO2 96%   BMI 22.16 kg/m²         Constitutional:       Appearance: Well-developed.   Eyes:      General: No scleral icterus.        Right eye: No discharge.   HENT:      Head: Normocephalic and atraumatic.   Neck:      Thyroid: No thyromegaly.      Lymphadenopathy: No cervical adenopathy.   Pulmonary:      Effort: Pulmonary effort is normal. No respiratory distress.      Breath sounds: Normal breath sounds. No wheezing. No rales.   Cardiovascular:      Normal rate. Regular rhythm.      No gallop.    Edema:     Peripheral edema absent.   Abdominal:      Tenderness: There is no abdominal tenderness.   Skin:     Findings: No erythema or rash.   Neurological:      Mental Status: Alert and oriented to person, place, and time.             Assessment:       Diagnosis Plan   1. Primary hypertension        2. Essential hypertension        3. Dyslipidemia        4. Chest pain, unspecified type                 Plan:       MDM:    1.  Paroxysmal SVT.    Symptoms are not present.  Episodes are infrequent recommend observation    2.  Hypertension:    Patient takes lisinopril today blood pressure is high but previous blood pressure are within desirable range recommend observation    3.  Mixed hyperlipidemia:    Patient is advised to repeat lipid panel testing  "